# Patient Record
Sex: FEMALE | Race: WHITE | Employment: OTHER | URBAN - METROPOLITAN AREA
[De-identification: names, ages, dates, MRNs, and addresses within clinical notes are randomized per-mention and may not be internally consistent; named-entity substitution may affect disease eponyms.]

---

## 2018-10-30 PROBLEM — C34.90 MALIGNANT NEOPLASM OF LUNG (HCC): Status: ACTIVE | Noted: 2018-10-30

## 2020-01-16 PROBLEM — Z71.89 COUNSELING AND COORDINATION OF CARE: Status: ACTIVE | Noted: 2020-01-16

## 2020-01-16 PROBLEM — F34.1 DYSTHYMIC DISORDER: Status: ACTIVE | Noted: 2020-01-16

## 2021-06-14 ENCOUNTER — HOSPITAL ENCOUNTER (OUTPATIENT)
Dept: VASCULAR LAB | Age: 81
Discharge: HOME | End: 2021-06-14
Payer: MEDICARE

## 2021-06-14 ENCOUNTER — HOSPITAL ENCOUNTER (OUTPATIENT)
Age: 81
Discharge: HOME | End: 2021-06-14
Payer: MEDICARE

## 2021-06-14 DIAGNOSIS — H53.9 VISION ABNORMALITIES: ICD-10-CM

## 2021-06-14 DIAGNOSIS — I63.9 CEREBROVASCULAR ACCIDENT (CVA), UNSPECIFIED MECHANISM (HCC): ICD-10-CM

## 2021-06-14 DIAGNOSIS — R42 DIZZY: ICD-10-CM

## 2021-06-14 LAB
A/G RATIO: 1.3 (ref 1.1–2.2)
ALBUMIN SERPL-MCNC: 3.8 G/DL (ref 3.4–5)
ALP BLD-CCNC: 96 U/L (ref 40–129)
ALT SERPL-CCNC: 16 U/L (ref 10–40)
ANION GAP SERPL CALCULATED.3IONS-SCNC: 9 MMOL/L (ref 3–16)
AST SERPL-CCNC: 21 U/L (ref 15–37)
BASOPHILS ABSOLUTE: 0.1 K/UL (ref 0–0.2)
BASOPHILS RELATIVE PERCENT: 0.9 %
BILIRUB SERPL-MCNC: 0.6 MG/DL (ref 0–1)
BUN BLDV-MCNC: 26 MG/DL (ref 7–20)
CALCIUM SERPL-MCNC: 8.9 MG/DL (ref 8.3–10.6)
CHLORIDE BLD-SCNC: 103 MMOL/L (ref 99–110)
CHOLESTEROL, TOTAL: 149 MG/DL (ref 0–199)
CO2: 28 MMOL/L (ref 21–32)
CREAT SERPL-MCNC: 0.8 MG/DL (ref 0.6–1.2)
EOSINOPHILS ABSOLUTE: 0.4 K/UL (ref 0–0.6)
EOSINOPHILS RELATIVE PERCENT: 5.5 %
FOLATE: >20 NG/ML (ref 4.78–24.2)
GFR AFRICAN AMERICAN: >60
GFR NON-AFRICAN AMERICAN: >60
GLOBULIN: 2.9 G/DL
GLUCOSE BLD-MCNC: 96 MG/DL (ref 70–99)
HCT VFR BLD CALC: 41.7 % (ref 36–48)
HDLC SERPL-MCNC: 64 MG/DL (ref 40–60)
HEMOGLOBIN: 13.9 G/DL (ref 12–16)
LDL CHOLESTEROL CALCULATED: 79 MG/DL
LYMPHOCYTES ABSOLUTE: 1.3 K/UL (ref 1–5.1)
LYMPHOCYTES RELATIVE PERCENT: 15.6 %
MCH RBC QN AUTO: 30.8 PG (ref 26–34)
MCHC RBC AUTO-ENTMCNC: 33.3 G/DL (ref 31–36)
MCV RBC AUTO: 92.6 FL (ref 80–100)
MONOCYTES ABSOLUTE: 0.8 K/UL (ref 0–1.3)
MONOCYTES RELATIVE PERCENT: 10.1 %
NEUTROPHILS ABSOLUTE: 5.5 K/UL (ref 1.7–7.7)
NEUTROPHILS RELATIVE PERCENT: 67.9 %
PDW BLD-RTO: 14.8 % (ref 12.4–15.4)
PLATELET # BLD: 173 K/UL (ref 135–450)
PMV BLD AUTO: 8.1 FL (ref 5–10.5)
POTASSIUM SERPL-SCNC: 4.2 MMOL/L (ref 3.5–5.1)
RBC # BLD: 4.51 M/UL (ref 4–5.2)
SEDIMENTATION RATE, ERYTHROCYTE: 8 MM/HR (ref 0–30)
SODIUM BLD-SCNC: 140 MMOL/L (ref 136–145)
TOTAL PROTEIN: 6.7 G/DL (ref 6.4–8.2)
TRIGL SERPL-MCNC: 31 MG/DL (ref 0–150)
TSH SERPL DL<=0.05 MIU/L-ACNC: 3.16 UIU/ML (ref 0.27–4.2)
VITAMIN B-12: 1503 PG/ML (ref 211–911)
VLDLC SERPL CALC-MCNC: 6 MG/DL
WBC # BLD: 8.1 K/UL (ref 4–11)

## 2021-06-14 PROCEDURE — 36415 COLL VENOUS BLD VENIPUNCTURE: CPT

## 2021-06-14 PROCEDURE — 85025 COMPLETE CBC W/AUTO DIFF WBC: CPT

## 2021-06-14 PROCEDURE — 84443 ASSAY THYROID STIM HORMONE: CPT

## 2021-06-14 PROCEDURE — 80053 COMPREHEN METABOLIC PANEL: CPT

## 2021-06-14 PROCEDURE — 85652 RBC SED RATE AUTOMATED: CPT

## 2021-06-14 PROCEDURE — 82746 ASSAY OF FOLIC ACID SERUM: CPT

## 2021-06-14 PROCEDURE — 80061 LIPID PANEL: CPT

## 2021-06-14 PROCEDURE — 82607 VITAMIN B-12: CPT

## 2021-06-14 PROCEDURE — 93880 EXTRACRANIAL BILAT STUDY: CPT

## 2022-06-07 ENCOUNTER — HOSPITAL ENCOUNTER (OUTPATIENT)
Dept: CT IMAGING | Age: 82
Discharge: HOME | End: 2022-06-07
Payer: MEDICARE

## 2022-06-07 DIAGNOSIS — R06.00 DYSPNEA, UNSPECIFIED TYPE: ICD-10-CM

## 2022-06-07 PROCEDURE — 6360000004 HC RX CONTRAST MEDICATION: Performed by: INTERNAL MEDICINE

## 2022-06-07 PROCEDURE — 71260 CT THORAX DX C+: CPT

## 2022-06-07 RX ADMIN — IOPAMIDOL 75 ML: 755 INJECTION, SOLUTION INTRAVENOUS at 16:32

## 2022-07-14 ENCOUNTER — HOSPITAL ENCOUNTER (OUTPATIENT)
Dept: GENERAL RADIOLOGY | Age: 82
Discharge: HOME | End: 2022-07-14
Payer: MEDICARE

## 2022-07-14 ENCOUNTER — HOSPITAL ENCOUNTER (OUTPATIENT)
Age: 82
Discharge: HOME | End: 2022-07-14
Payer: MEDICARE

## 2022-07-14 DIAGNOSIS — R05.9 COUGH: ICD-10-CM

## 2022-07-14 PROCEDURE — 71046 X-RAY EXAM CHEST 2 VIEWS: CPT

## 2023-04-17 PROBLEM — J44.9 CHRONIC OBSTRUCTIVE PULMONARY DISEASE, UNSPECIFIED COPD TYPE (HCC): Status: ACTIVE | Noted: 2023-04-17

## 2024-02-20 ENCOUNTER — HOSPITAL ENCOUNTER (INPATIENT)
Age: 84
LOS: 2 days | Discharge: HOME | DRG: 194 | End: 2024-02-22
Attending: INTERNAL MEDICINE | Admitting: INTERNAL MEDICINE
Payer: MEDICARE

## 2024-02-20 PROBLEM — J18.8 OTHER PNEUMONIA, UNSPECIFIED ORGANISM: Status: ACTIVE | Noted: 2024-02-20

## 2024-02-20 PROBLEM — J18.9 PNEUMONIA DUE TO ORGANISM: Status: ACTIVE | Noted: 2024-02-20

## 2024-02-20 PROCEDURE — 1200000000 HC SEMI PRIVATE

## 2024-02-20 PROCEDURE — 2580000003 HC RX 258: Performed by: INTERNAL MEDICINE

## 2024-02-20 PROCEDURE — 6360000002 HC RX W HCPCS: Performed by: INTERNAL MEDICINE

## 2024-02-20 RX ORDER — HYDROCODONE BITARTRATE AND ACETAMINOPHEN 5; 325 MG/1; MG/1
1 TABLET ORAL EVERY 6 HOURS PRN
Status: DISCONTINUED | OUTPATIENT
Start: 2024-02-20 | End: 2024-02-22 | Stop reason: HOSPADM

## 2024-02-20 RX ORDER — SERTRALINE HYDROCHLORIDE 25 MG/1
25 TABLET, FILM COATED ORAL DAILY
Status: DISCONTINUED | OUTPATIENT
Start: 2024-02-21 | End: 2024-02-22 | Stop reason: HOSPADM

## 2024-02-20 RX ORDER — IPRATROPIUM BROMIDE AND ALBUTEROL SULFATE 2.5; .5 MG/3ML; MG/3ML
1 SOLUTION RESPIRATORY (INHALATION)
Status: DISCONTINUED | OUTPATIENT
Start: 2024-02-21 | End: 2024-02-22 | Stop reason: HOSPADM

## 2024-02-20 RX ORDER — ENOXAPARIN SODIUM 100 MG/ML
40 INJECTION SUBCUTANEOUS DAILY
Status: DISCONTINUED | OUTPATIENT
Start: 2024-02-21 | End: 2024-02-22 | Stop reason: HOSPADM

## 2024-02-20 RX ORDER — ACETAMINOPHEN 500 MG
500 TABLET ORAL EVERY 6 HOURS PRN
Status: DISCONTINUED | OUTPATIENT
Start: 2024-02-20 | End: 2024-02-22 | Stop reason: HOSPADM

## 2024-02-20 RX ORDER — LEVOFLOXACIN 5 MG/ML
500 INJECTION, SOLUTION INTRAVENOUS EVERY 24 HOURS
Status: DISCONTINUED | OUTPATIENT
Start: 2024-02-20 | End: 2024-02-22 | Stop reason: HOSPADM

## 2024-02-20 RX ORDER — FAMOTIDINE 20 MG/1
20 TABLET, FILM COATED ORAL 2 TIMES DAILY
Status: DISCONTINUED | OUTPATIENT
Start: 2024-02-20 | End: 2024-02-22 | Stop reason: HOSPADM

## 2024-02-20 RX ORDER — SODIUM CHLORIDE 9 MG/ML
INJECTION, SOLUTION INTRAVENOUS CONTINUOUS
Status: DISCONTINUED | OUTPATIENT
Start: 2024-02-20 | End: 2024-02-22 | Stop reason: HOSPADM

## 2024-02-20 RX ADMIN — LEVOFLOXACIN 500 MG: 5 INJECTION, SOLUTION INTRAVENOUS at 22:04

## 2024-02-20 RX ADMIN — SODIUM CHLORIDE: 9 INJECTION, SOLUTION INTRAVENOUS at 22:01

## 2024-02-21 LAB
ALBUMIN SERPL-MCNC: 3.4 G/DL (ref 3.4–5)
ALBUMIN/GLOB SERPL: 1.3 {RATIO} (ref 1.1–2.2)
ALP SERPL-CCNC: 77 U/L (ref 40–129)
ALT SERPL-CCNC: 11 U/L (ref 10–40)
ANION GAP SERPL CALCULATED.3IONS-SCNC: 6 MMOL/L (ref 3–16)
AST SERPL-CCNC: 15 U/L (ref 15–37)
BILIRUB SERPL-MCNC: 0.4 MG/DL (ref 0–1)
BUN SERPL-MCNC: 24 MG/DL (ref 7–20)
CALCIUM SERPL-MCNC: 8.8 MG/DL (ref 8.3–10.6)
CHLORIDE SERPL-SCNC: 101 MMOL/L (ref 99–110)
CO2 SERPL-SCNC: 34 MMOL/L (ref 21–32)
CREAT SERPL-MCNC: 0.7 MG/DL (ref 0.6–1.2)
DEPRECATED RDW RBC AUTO: 15.8 % (ref 12.4–15.4)
GFR SERPLBLD CREATININE-BSD FMLA CKD-EPI: >60 ML/MIN/{1.73_M2}
GLUCOSE SERPL-MCNC: 86 MG/DL (ref 70–99)
HCT VFR BLD AUTO: 39.1 % (ref 36–48)
HGB BLD-MCNC: 12.8 G/DL (ref 12–16)
MCH RBC QN AUTO: 30.1 PG (ref 26–34)
MCHC RBC AUTO-ENTMCNC: 32.6 G/DL (ref 31–36)
MCV RBC AUTO: 92.1 FL (ref 80–100)
PLATELET # BLD AUTO: 176 K/UL (ref 135–450)
PMV BLD AUTO: 8.7 FL (ref 5–10.5)
POTASSIUM SERPL-SCNC: 4.4 MMOL/L (ref 3.5–5.1)
PROT SERPL-MCNC: 6.1 G/DL (ref 6.4–8.2)
RBC # BLD AUTO: 4.24 M/UL (ref 4–5.2)
SODIUM SERPL-SCNC: 141 MMOL/L (ref 136–145)
WBC # BLD AUTO: 8.6 K/UL (ref 4–11)

## 2024-02-21 PROCEDURE — 85027 COMPLETE CBC AUTOMATED: CPT

## 2024-02-21 PROCEDURE — 2700000000 HC OXYGEN THERAPY PER DAY

## 2024-02-21 PROCEDURE — 6360000002 HC RX W HCPCS: Performed by: INTERNAL MEDICINE

## 2024-02-21 PROCEDURE — 36415 COLL VENOUS BLD VENIPUNCTURE: CPT

## 2024-02-21 PROCEDURE — 99222 1ST HOSP IP/OBS MODERATE 55: CPT | Performed by: INTERNAL MEDICINE

## 2024-02-21 PROCEDURE — 94760 N-INVAS EAR/PLS OXIMETRY 1: CPT

## 2024-02-21 PROCEDURE — 1200000000 HC SEMI PRIVATE

## 2024-02-21 PROCEDURE — 94640 AIRWAY INHALATION TREATMENT: CPT

## 2024-02-21 PROCEDURE — 6370000000 HC RX 637 (ALT 250 FOR IP): Performed by: INTERNAL MEDICINE

## 2024-02-21 PROCEDURE — 80053 COMPREHEN METABOLIC PANEL: CPT

## 2024-02-21 RX ORDER — M-VIT,TX,IRON,MINS/CALC/FOLIC 27MG-0.4MG
1 TABLET ORAL DAILY
COMMUNITY

## 2024-02-21 RX ORDER — OMEGA-3S/DHA/EPA/FISH OIL/D3 300MG-1000
400 CAPSULE ORAL DAILY
COMMUNITY

## 2024-02-21 RX ORDER — ONDANSETRON 2 MG/ML
4 INJECTION INTRAMUSCULAR; INTRAVENOUS EVERY 6 HOURS PRN
Status: DISCONTINUED | OUTPATIENT
Start: 2024-02-21 | End: 2024-02-22 | Stop reason: HOSPADM

## 2024-02-21 RX ADMIN — LEVOFLOXACIN 500 MG: 5 INJECTION, SOLUTION INTRAVENOUS at 21:20

## 2024-02-21 RX ADMIN — HYDROCODONE BITARTRATE AND ACETAMINOPHEN 1 TABLET: 5; 325 TABLET ORAL at 23:25

## 2024-02-21 RX ADMIN — ENOXAPARIN SODIUM 40 MG: 100 INJECTION SUBCUTANEOUS at 08:56

## 2024-02-21 RX ADMIN — HYDROCODONE BITARTRATE AND ACETAMINOPHEN 1 TABLET: 5; 325 TABLET ORAL at 00:06

## 2024-02-21 RX ADMIN — IPRATROPIUM BROMIDE AND ALBUTEROL SULFATE 1 DOSE: 2.5; .5 SOLUTION RESPIRATORY (INHALATION) at 19:15

## 2024-02-21 RX ADMIN — HYDROCODONE BITARTRATE AND ACETAMINOPHEN 1 TABLET: 5; 325 TABLET ORAL at 08:56

## 2024-02-21 RX ADMIN — IPRATROPIUM BROMIDE AND ALBUTEROL SULFATE 1 DOSE: 2.5; .5 SOLUTION RESPIRATORY (INHALATION) at 13:40

## 2024-02-21 RX ADMIN — IPRATROPIUM BROMIDE AND ALBUTEROL SULFATE 1 DOSE: 2.5; .5 SOLUTION RESPIRATORY (INHALATION) at 16:16

## 2024-02-21 RX ADMIN — HYDROCODONE BITARTRATE AND ACETAMINOPHEN 1 TABLET: 5; 325 TABLET ORAL at 18:11

## 2024-02-21 RX ADMIN — FAMOTIDINE 20 MG: 20 TABLET, FILM COATED ORAL at 21:20

## 2024-02-21 RX ADMIN — IPRATROPIUM BROMIDE AND ALBUTEROL SULFATE 1 DOSE: 2.5; .5 SOLUTION RESPIRATORY (INHALATION) at 09:06

## 2024-02-21 ASSESSMENT — PAIN - FUNCTIONAL ASSESSMENT
PAIN_FUNCTIONAL_ASSESSMENT: ACTIVITIES ARE NOT PREVENTED
PAIN_FUNCTIONAL_ASSESSMENT: ACTIVITIES ARE NOT PREVENTED

## 2024-02-21 ASSESSMENT — PAIN SCALES - GENERAL
PAINLEVEL_OUTOF10: 4
PAINLEVEL_OUTOF10: 6
PAINLEVEL_OUTOF10: 6
PAINLEVEL_OUTOF10: 8

## 2024-02-21 ASSESSMENT — PAIN DESCRIPTION - DESCRIPTORS
DESCRIPTORS: ACHING
DESCRIPTORS: ACHING;DISCOMFORT
DESCRIPTORS: ACHING;DISCOMFORT
DESCRIPTORS: ACHING;DISCOMFORT;CRAMPING

## 2024-02-21 ASSESSMENT — PAIN DESCRIPTION - LOCATION
LOCATION: BACK;CHEST
LOCATION: RIB CAGE
LOCATION: BACK
LOCATION: RIB CAGE

## 2024-02-21 ASSESSMENT — PAIN DESCRIPTION - ONSET: ONSET: GRADUAL

## 2024-02-21 ASSESSMENT — PAIN DESCRIPTION - ORIENTATION
ORIENTATION: LEFT
ORIENTATION: LEFT
ORIENTATION: LEFT;RIGHT

## 2024-02-21 ASSESSMENT — PAIN DESCRIPTION - PAIN TYPE: TYPE: CHRONIC PAIN

## 2024-02-21 ASSESSMENT — PAIN DESCRIPTION - FREQUENCY: FREQUENCY: INTERMITTENT

## 2024-02-21 NOTE — PLAN OF CARE
Problem: Discharge Planning  Goal: Discharge to home or other facility with appropriate resources  Outcome: Progressing  Flowsheets (Taken 2/21/2024 0900)  Discharge to home or other facility with appropriate resources: Identify barriers to discharge with patient and caregiver     Problem: Safety - Adult  Goal: Free from fall injury  Outcome: Progressing     Problem: ABCDS Injury Assessment  Goal: Absence of physical injury  Outcome: Progressing     Problem: Pain  Goal: Verbalizes/displays adequate comfort level or baseline comfort level  Outcome: Progressing

## 2024-02-21 NOTE — PROGRESS NOTES
Pt had episode of clear emesis. Called Dr. Cardozo office to ask her to order Zofran. MD ordered.

## 2024-02-21 NOTE — H&P
Hospital Medicine History & Physical    Patient:  Nohemi Pederson  YOB: 1940  Date of Service: 2/21/24  MRN: [id number]    PCP: Vangie Cardozo MD    Date of Admission: 2/20/2024      Chief Complaint:  No chief complaint on file.        History Of Present Illness:    The patient is a 83 y.o. female who presents to [facility] with c/o as above  Had a fall few days ago  Has rib fx  Now is diagnosed with pneumonia  On iv abx  Feels better  Denies any other complaints    Past Medical History:        Diagnosis Date    Hypertension        Past Surgical History:        Procedure Laterality Date    JOINT REPLACEMENT         Family History:  Family History   Problem Relation Age of Onset    High Blood Pressure Mother     High Blood Pressure Father        Medications Prior to Admission:    Prior to Admission medications    Medication Sig Start Date End Date Taking? Authorizing Provider   Multiple Vitamins-Minerals (THERAPEUTIC MULTIVITAMIN-MINERALS) tablet Take 1 tablet by mouth daily   Yes ProviderKemar MD   cholecalciferol (VITAMIN D3) 400 UNIT TABS tablet Take 1 tablet by mouth daily   Yes ProviderKemar MD   sertraline (ZOLOFT) 25 MG tablet Take 1 tablet by mouth daily  Patient taking differently: Take 1 tablet by mouth daily as needed (anxiety) 9/27/23   Vangie Cardozo MD   cetirizine (ZYRTEC ALLERGY) 10 MG tablet Take 1 tablet by mouth daily  Patient not taking: Reported on 2/21/2024 4/17/23   Vangie Cardozo MD   escitalopram (LEXAPRO) 10 MG tablet Take 1 tablet by mouth daily  Patient not taking: Reported on 2/21/2024 6/7/22   Vangie Cardozo MD   atorvastatin (LIPITOR) 10 MG tablet TAKE ONE TABLET BY MOUTH DAILY  Patient not taking: Reported on 2/21/2024 2/14/22   aVngie Cardozo MD   furosemide (LASIX) 20 MG tablet Take 1

## 2024-02-21 NOTE — PROGRESS NOTES
4 Eyes Skin Assessment     NAME:  Nohemi Pederson  YOB: 1940  MEDICAL RECORD NUMBER:  [id number]    The patient is being assessed for  Admission    I agree that at least one RN has performed a thorough Head to Toe Skin Assessment on the patient. ALL assessment sites listed below have been assessed.      Areas assessed by both nurses:    Head, Face, Ears, Shoulders, Back, Chest, Arms, Elbows, Hands, Sacrum. Buttock, Coccyx, Ischium, and Legs. Feet and Heels        Does the Patient have a Wound? No noted wound(s)       [place] Prevention initiated by RN: No  Wound Care Orders initiated by RN: No    Pressure Injury (Stage 3,4, Unstageable, DTI, NWPT, and Complex wounds) if present, place Wound referral order by RN under : No    New Ostomies, if present place, Ostomy referral order under : No     Nurse 1 eSignature: Electronically signed by Krystle Mai RN on 2/21/24 at 6:06 AM EST    **SHARE this note so that the co-signing nurse can place an eSignature**    Nurse 2 eSignature: Electronically signed by Sade Ruelas RN on 2/21/24 at 6:07 AM EST

## 2024-02-21 NOTE — CARE COORDINATION
Case Management Assessment  Initial Evaluation    Date/Time of Evaluation: 2/21/2024 10:42 AM  Assessment Completed by: EFRA Sagastume    If patient is discharged prior to next notation, then this note serves as note for discharge by case management.    Patient Name: Nohemi Pederson                   YOB: 1940  Diagnosis: Pneumonia due to organism [J18.9]  Other pneumonia, unspecified organism [J18.8]                   Date / Time: 2/20/2024  8:45 PM    Patient Admission Status: Inpatient   Readmission Risk (Low < 19, Mod (19-27), High > 27): Readmission Risk Score: 10.5    Current PCP: Vangie Cardozo MD  PCP verified by CM? (P) Yes    Chart Reviewed: Yes      History Provided by: (P) Patient  Patient Orientation: (P) Alert and Oriented, Person, Place, Situation, Self    Patient Cognition: (P) Alert    Hospitalization in the last 30 days (Readmission):  No    If yes, Readmission Assessment in  Navigator will be completed.    Advance Directives:      Code Status: Full Code   Patient's Primary Decision Maker is: (P) Legal Next of Kin      Discharge Planning:    Patient lives with: (P) Spouse/Significant Other Type of Home: (P) House (Ranch home with 4+4 ELIECER)  Primary Care Giver: (P) Self  Patient Support Systems include: (P) Spouse/Significant Other   Current Financial resources: (P) Medicare  Current community resources: (P) None  Current services prior to admission: (P) Durable Medical Equipment, Oxygen Therapy            Current DME: (P) Oxygen Therapy (Comment) (2 L through [facility].  Has portability.)            Type of Home Care services:  (P) None    ADLS  Prior functional level: (P) Independent in ADLs/IADLs  Current functional level: (P) Independent in ADLs/IADLs    PT AM-PAC:   /24  OT AM-PAC:   /24    Family can provide assistance at DC: (P) Yes  Would you like Case Management to discuss the discharge plan with any other family members/significant others, and if so, who? (P) Yes

## 2024-02-21 NOTE — PROGRESS NOTES
Pt arrived to room 4116 from [facility] via [facility] transport. Pt ambulated around room independently, tolerated well but d/t admission for fall standard safety precautions in place. VSS on 2L NC, dependent. A&Ox4, oriented to room and call light. No orders placed d/t pt being a direct admit. Call placed to Dr. Cardozo, see new orders. Bedside table and needed items within reach. Admission questions and assessment completed, pt states no additional needs at this time will continue to monitor.

## 2024-02-22 VITALS
TEMPERATURE: 97.8 F | WEIGHT: 91.27 LBS | OXYGEN SATURATION: 99 % | BODY MASS INDEX: 17.92 KG/M2 | HEART RATE: 80 BPM | RESPIRATION RATE: 16 BRPM | SYSTOLIC BLOOD PRESSURE: 106 MMHG | DIASTOLIC BLOOD PRESSURE: 57 MMHG | HEIGHT: 60 IN

## 2024-02-22 PROCEDURE — 99238 HOSP IP/OBS DSCHRG MGMT 30/<: CPT | Performed by: INTERNAL MEDICINE

## 2024-02-22 PROCEDURE — 6370000000 HC RX 637 (ALT 250 FOR IP): Performed by: INTERNAL MEDICINE

## 2024-02-22 PROCEDURE — 94640 AIRWAY INHALATION TREATMENT: CPT

## 2024-02-22 PROCEDURE — 2700000000 HC OXYGEN THERAPY PER DAY

## 2024-02-22 PROCEDURE — 94761 N-INVAS EAR/PLS OXIMETRY MLT: CPT

## 2024-02-22 PROCEDURE — 6360000002 HC RX W HCPCS: Performed by: INTERNAL MEDICINE

## 2024-02-22 RX ORDER — LEVOFLOXACIN 500 MG/1
500 TABLET, FILM COATED ORAL DAILY
Qty: 7 TABLET | Refills: 0 | Status: SHIPPED | OUTPATIENT
Start: 2024-02-22 | End: 2024-02-29

## 2024-02-22 RX ORDER — CLONIDINE HYDROCHLORIDE 0.1 MG/1
0.2 TABLET ORAL ONCE
Status: COMPLETED | OUTPATIENT
Start: 2024-02-22 | End: 2024-02-22

## 2024-02-22 RX ORDER — ALBUTEROL SULFATE 90 UG/1
2 AEROSOL, METERED RESPIRATORY (INHALATION) 4 TIMES DAILY PRN
Qty: 18 G | Refills: 0 | Status: SHIPPED | OUTPATIENT
Start: 2024-02-22

## 2024-02-22 RX ORDER — LORAZEPAM 2 MG/ML
0.5 INJECTION INTRAMUSCULAR ONCE
Status: COMPLETED | OUTPATIENT
Start: 2024-02-22 | End: 2024-02-22

## 2024-02-22 RX ADMIN — IPRATROPIUM BROMIDE AND ALBUTEROL SULFATE 1 DOSE: 2.5; .5 SOLUTION RESPIRATORY (INHALATION) at 08:02

## 2024-02-22 RX ADMIN — SERTRALINE 25 MG: 25 TABLET, FILM COATED ORAL at 08:21

## 2024-02-22 RX ADMIN — IPRATROPIUM BROMIDE AND ALBUTEROL SULFATE 1 DOSE: 2.5; .5 SOLUTION RESPIRATORY (INHALATION) at 11:58

## 2024-02-22 RX ADMIN — CLONIDINE HYDROCHLORIDE 0.2 MG: 0.1 TABLET ORAL at 02:55

## 2024-02-22 RX ADMIN — ENOXAPARIN SODIUM 40 MG: 100 INJECTION SUBCUTANEOUS at 08:21

## 2024-02-22 RX ADMIN — ONDANSETRON 4 MG: 2 INJECTION INTRAMUSCULAR; INTRAVENOUS at 02:37

## 2024-02-22 RX ADMIN — Medication 0.5 MG: at 02:31

## 2024-02-22 RX ADMIN — FAMOTIDINE 20 MG: 20 TABLET, FILM COATED ORAL at 08:21

## 2024-02-22 RX ADMIN — HYDROCODONE BITARTRATE AND ACETAMINOPHEN 1 TABLET: 5; 325 TABLET ORAL at 13:18

## 2024-02-22 RX ADMIN — IPRATROPIUM BROMIDE AND ALBUTEROL SULFATE 1 DOSE: 2.5; .5 SOLUTION RESPIRATORY (INHALATION) at 15:46

## 2024-02-22 ASSESSMENT — PAIN DESCRIPTION - DESCRIPTORS: DESCRIPTORS: ACHING

## 2024-02-22 ASSESSMENT — PAIN SCALES - GENERAL
PAINLEVEL_OUTOF10: 0
PAINLEVEL_OUTOF10: 8

## 2024-02-22 ASSESSMENT — PAIN DESCRIPTION - LOCATION: LOCATION: RIB CAGE

## 2024-02-22 ASSESSMENT — PAIN - FUNCTIONAL ASSESSMENT: PAIN_FUNCTIONAL_ASSESSMENT: PREVENTS OR INTERFERES SOME ACTIVE ACTIVITIES AND ADLS

## 2024-02-22 ASSESSMENT — PAIN DESCRIPTION - PAIN TYPE: TYPE: ACUTE PAIN

## 2024-02-22 ASSESSMENT — PAIN DESCRIPTION - ORIENTATION: ORIENTATION: LEFT

## 2024-02-22 ASSESSMENT — PAIN DESCRIPTION - FREQUENCY: FREQUENCY: INTERMITTENT

## 2024-02-22 ASSESSMENT — PAIN DESCRIPTION - ONSET: ONSET: ON-GOING

## 2024-02-22 NOTE — PROGRESS NOTES
Pt A&Ox4, VSS, denies any pain. Pt is on 2L/NC, which is pts baseline. Pt denies any SOB at this time. Pt has NS running at 5ml/hr. Pt took morning medications without any issues. Pt denies further needs at this time. All safety measures in place.     Electronically signed by Tarah Yun RN on 2/22/2024 at 10:39 AM

## 2024-02-22 NOTE — FLOWSHEET NOTE
Patient stated she feels better now that she's outside the room.     Call made to Dr. Cardozo's on- call physician. Left a message regarding pts. Condition, awaiting for response.

## 2024-02-22 NOTE — FLOWSHEET NOTE
Received Ativan 2mg/ml from Pharmacy.  Gave 0.5mg= 0.25ml IV as ordered. Wasted remaining 0.75ml, as advised by Pharmacy staff. Witnessed and Co-signed by Josse Vee RN.   Electronically signed by Josse Lawson RN on 2/22/2024 at 3:06 AM

## 2024-02-22 NOTE — DISCHARGE INSTR - DIET

## 2024-02-22 NOTE — FLOWSHEET NOTE
Pt. Is noted to be restless, stated she is feeling claustrophobic. Opted to sit outside the room. V/S bp= 173/90., WY= 91    Secure message sent to Meg Song NP, with response to inform attending Physician.

## 2024-02-22 NOTE — PROGRESS NOTES
Written and verbal DC instructions reviewed with pt. Pt states understanding, all questions answered. IV removed without complications. Dressing clean dry and intact. Pt awaiting ride to arrive.    Electronically signed by Tarah Yun RN on 2/22/2024 at 3:58 PM

## 2024-02-22 NOTE — PLAN OF CARE
Problem: Discharge Planning  Goal: Discharge to home or other facility with appropriate resources  2/22/2024 1013 by Tarah Yun RN  Outcome: Progressing  2/21/2024 2148 by Radha King RN  Outcome: Progressing  Flowsheets (Taken 2/21/2024 2140)  Discharge to home or other facility with appropriate resources: Identify barriers to discharge with patient and caregiver     Problem: Safety - Adult  Goal: Free from fall injury  2/22/2024 1013 by Tarah Yun RN  Outcome: Progressing  2/21/2024 2148 by Radha King RN  Outcome: Progressing     Problem: ABCDS Injury Assessment  Goal: Absence of physical injury  2/22/2024 1013 by Tarah Yun RN  Outcome: Progressing  2/21/2024 2148 by Radha King RN  Outcome: Progressing     Problem: Pain  Goal: Verbalizes/displays adequate comfort level or baseline comfort level  2/22/2024 1013 by Tarah Yun RN  Outcome: Progressing  2/21/2024 2148 by Radha King RN  Outcome: Progressing     Problem: Respiratory - Adult  Goal: Achieves optimal ventilation and oxygenation  2/22/2024 1013 by Tarah Yun RN  Outcome: Progressing  2/21/2024 2148 by Radha King RN  Outcome: Progressing  Flowsheets (Taken 2/21/2024 2140)  Achieves optimal ventilation and oxygenation: Assess for changes in respiratory status

## 2024-02-22 NOTE — PLAN OF CARE
Problem: Discharge Planning  Goal: Discharge to home or other facility with appropriate resources  2/21/2024 2148 by Radha King RN  Outcome: Progressing  Flowsheets (Taken 2/21/2024 2140)  Discharge to home or other facility with appropriate resources: Identify barriers to discharge with patient and caregiver  2/21/2024 1428 by Milvia Jiang RN  Outcome: Progressing  Flowsheets (Taken 2/21/2024 0900)  Discharge to home or other facility with appropriate resources: Identify barriers to discharge with patient and caregiver     Problem: Safety - Adult  Goal: Free from fall injury  2/21/2024 2148 by Radha King RN  Outcome: Progressing  2/21/2024 1428 by Milvia Jiang RN  Outcome: Progressing     Problem: ABCDS Injury Assessment  Goal: Absence of physical injury  2/21/2024 2148 by Radha King RN  Outcome: Progressing  2/21/2024 1428 by Milvia Jiang RN  Outcome: Progressing     Problem: Pain  Goal: Verbalizes/displays adequate comfort level or baseline comfort level  2/21/2024 2148 by Radha King RN  Outcome: Progressing  2/21/2024 1428 by Milvia Jiang RN  Outcome: Progressing     Problem: Respiratory - Adult  Goal: Achieves optimal ventilation and oxygenation  Outcome: Progressing  Flowsheets (Taken 2/21/2024 2140)  Achieves optimal ventilation and oxygenation: Assess for changes in respiratory status

## 2024-02-22 NOTE — FLOWSHEET NOTE
Telephone order received from Dr. Cardozo. Obtained orders for Clonidine 0.2mg PO, 1 time dose and Ativan 0.5mg IV, 1 time dose. Writer read back orders, Dr. Cardozo verified accuracy of orders.

## 2024-02-23 ENCOUNTER — CARE COORDINATION (OUTPATIENT)
Dept: CASE MANAGEMENT | Age: 84
End: 2024-02-23

## 2024-02-23 NOTE — CARE COORDINATION
Care Transitions Outreach Attempt    Call within 2 business days of discharge: Yes     First initial 24 hr follow up outreach call attempt, no answer.  CTN could not leave vm as phone rang and rang.   CTN will continue with outreach call attempts.      Patient: Nohemi Pederson Patient : 1940 MRN: [id number]    Last Discharge Facility       Date Complaint Diagnosis Description Type Department Provider    24   Admission (Discharged) [facility]W Vangie Cardozo MD              Was this an external facility discharge? No Discharge Facility Name: [facility]    Noted following upcoming appointments from discharge chart review:   [facility] follow up appointment(s): No future appointments.  Non-[facility]  follow up appointment(s): n/a    TERRI Puga, RN   Care Transition Nurse  Mobile: [phone]

## 2024-02-26 ENCOUNTER — CARE COORDINATION (OUTPATIENT)
Dept: CASE MANAGEMENT | Age: 84
End: 2024-02-26

## 2024-02-26 NOTE — CARE COORDINATION
Care Transitions Initial Follow Up Call    Call within 2 business days of discharge: Yes    Patient Current Location:  Home: [address]  [facility] [address]    Care Transition Nurse contacted the patient by telephone to perform post hospital discharge assessment. Verified name and  with patient as identifiers. Provided introduction to self, and explanation of the Care Transition Nurse role.     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 Continuous O2  Discharge Date: 24 RARS: Readmission Risk Score: 9.6      Last Discharge Facility       Date Complaint Diagnosis Description Type Department Provider    24   Admission (Discharged) [facility] Vangie Cardozo MD            Was this an external facility discharge? No Discharge Facility:     Challenges to be reviewed by the provider   Additional needs identified to be addressed with provider: Yes  Post hospitalization visit still needs scheduled. Patient continues to be uncomfortable with rib pain and unsure when she would be able to come into office for visit. Please contact patient at [phone] to assist with scheduling. Declined assistance from CTN. Thank you         Method of communication with provider: chart routing.    Patient answered call and verified . Patient pleasant and agreeable to transition call. Continues to be uncomfortable from rib pain. Confirmed that she has AVS and taking all medication as directed. Getting some relief with prescription pain medication, but minimal. Continues to wear O2 continuously. CTN offered to assist with TCM scheduling, but declined. Stated she continues to have significant rib pain and unsure when she would be able to go to any appt. CTN routed message to provider to assist with scheduling. Denied any acute needs at present time.  Agreeable to f/u calls.

## 2024-02-29 ENCOUNTER — CARE COORDINATION (OUTPATIENT)
Dept: CASE MANAGEMENT | Age: 84
End: 2024-02-29

## 2024-02-29 NOTE — CARE COORDINATION
Care Transitions Outreach Attempt    Follow up outreach call attempt, no answer.  CTN left VM with contact information and request for return call.  CTN will continue with outreach call attempts.    Patient: Nohemi Pederson Patient : 1940 MRN: [id number]    Last Discharge Facility       Date Complaint Diagnosis Description Type Department Provider    24   Admission (Discharged) Vangie Clay MD              Was this an external facility discharge? No Discharge Facility Name: [facility]    Noted following upcoming appointments from discharge chart review:   [facility] follow up appointment(s): No future appointments.  Non-[facility]  follow up appointment(s): n/a    TERRI Puga, RN   Care Transition Nurse  Mobile: [phone]

## 2024-03-05 ENCOUNTER — CARE COORDINATION (OUTPATIENT)
Dept: CASE MANAGEMENT | Age: 84
End: 2024-03-05

## 2024-03-05 NOTE — CARE COORDINATION
Care Transitions Follow Up Call    Patient Current Location:  Home: [address]  [place] [facility] [facility] [address]    [facility] Care Coordinator contacted the patient by telephone to follow up after admission on .  Verified name and  with patient as identifiers.    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 Continuous O2  Discharge Date: 24 RARS: Readmission Risk Score: 9.6      Needs to be reviewed by the provider   Additional needs identified to be addressed with provider: YES  Post hospitalization visit still needs scheduled. Patient continues to be uncomfortable with rib pain and unsure when she would be able to come into office for visit. Please contact patient at [phone] to assist with scheduling. Declined assistance from CTN. Thank you              Method of communication with provider: none.    Spoke with Nohemi Pederson who reported that she is doing ok. Patient stated that she do still have rib pain average pain 6-8/10. Patient stated that with deep breathing pain goes to an 8/10. Patient stated that she continue on O2 at 2 liter via n/c. She stated that she do not wear it all the time because she breaths fine when she is just sitting. Patient stated that she also takes O2 off when she needs to move around the house because she has a dog and a  with dementia who from time to time will get all twisted up in the tubing so to prevent falls she will put O2 on and recovery when she get back to her chair. Patient had just did this exact thing when she answered the phone with writer. Patient applied O2 while talking with writer. Patient checked O2 sat and it was 94%. Patient c/o dry mouth which she stated that her daughter purchase some biotene which she stated that she uses at night. Patient reported that she is also using a mouthwash from her dentist for some

## 2024-03-06 ENCOUNTER — HOSPITAL ENCOUNTER (OUTPATIENT)
Age: 84
Discharge: HOME | End: 2024-03-06
Payer: MEDICARE

## 2024-03-06 ENCOUNTER — CARE COORDINATION (OUTPATIENT)
Dept: CASE MANAGEMENT | Age: 84
End: 2024-03-06

## 2024-03-06 ENCOUNTER — HOSPITAL ENCOUNTER (OUTPATIENT)
Dept: GENERAL RADIOLOGY | Age: 84
Discharge: HOME | End: 2024-03-06
Attending: INTERNAL MEDICINE
Payer: MEDICARE

## 2024-03-06 DIAGNOSIS — J44.9 CHRONIC OBSTRUCTIVE PULMONARY DISEASE, UNSPECIFIED COPD TYPE (HCC): ICD-10-CM

## 2024-03-06 PROCEDURE — 71046 X-RAY EXAM CHEST 2 VIEWS: CPT

## 2024-03-06 NOTE — CARE COORDINATION
Care Transitions Follow Up Call    Patient Current Location:  Home: [address]  [place] [address]    Care Transition Nurse contacted the patient by telephone to follow up after admission on .  Verified name and  with patient as identifiers.    Patient: Nohemi Pederson  Patient : 1940   MRN: [id number]  Reason for Admission: pna  Discharge Date: 24 RARS: Readmission Risk Score: 9.6      Needs to be reviewed by the provider   Additional needs identified to be addressed with provider: No  none             Method of communication with provider: none.    Conversation:  Spoke to Nohemi Pederson. She was getting ready to go to her PCP appt and could not talk. Was a little SOB but states she has her O2 on and will sit and catch her breath. States cannot talk to me right now.         External follow up appointment(s):       Care Transitions Subsequent and Final Call    Subsequent and Final Calls  Care Transitions Interventions  Other Interventions:             Care Transition Nurse provided contact information for future needs. Plan for follow-up call in 1-2 days based on severity of symptoms and risk factors.  Plan for next call: symptom management-  Assess overall status, abnormal signs and symptoms, availability and effectiveness of medications, activity level and tolerance, falls/other unexpected events, findings from follow up appointments, seeking medical attention, who/when to call prn any needs, etc.     Becki Gutierrez, BASHIRN, RN   [facility] Transition Nurse  [phone]

## 2024-03-11 ENCOUNTER — CARE COORDINATION (OUTPATIENT)
Dept: CASE MANAGEMENT | Age: 84
End: 2024-03-11

## 2024-03-11 NOTE — CARE COORDINATION
Care Transitions Follow Up Call    Patient: Nohemi Pederson  Patient : 1940   MRN: [id number]  Reason for Admission: Rib fx, PNA, COPD  Discharge Date: 24 RARS: Readmission Risk Score: 9.6    Second and final outreach call attempt, no answer.  CTN left VM with contact information and request for return call.  CTN will resolve episode and remain available.    Pt had a HFU with PCP 3/6.     Follow Up  No future appointments.  External follow up appointment(s): n/a    TERRI Puga, RN   Care Transition Nurse  Mobile: [phone]

## 2025-06-16 ENCOUNTER — HOSPITAL ENCOUNTER (OUTPATIENT)
Dept: GENERAL RADIOLOGY | Age: 85
Discharge: HOME | End: 2025-06-16
Attending: INTERNAL MEDICINE
Payer: MEDICARE

## 2025-06-16 ENCOUNTER — HOSPITAL ENCOUNTER (OUTPATIENT)
Age: 85
Discharge: HOME | End: 2025-06-16
Payer: MEDICARE

## 2025-06-16 DIAGNOSIS — R07.89 CHEST WALL PAIN: ICD-10-CM

## 2025-06-16 PROBLEM — J96.11 CHRONIC RESPIRATORY FAILURE WITH HYPOXIA (HCC): Status: ACTIVE | Noted: 2025-06-16

## 2025-06-16 PROCEDURE — 71120 X-RAY EXAM BREASTBONE 2/>VWS: CPT

## 2025-06-16 PROCEDURE — 71110 X-RAY EXAM RIBS BIL 3 VIEWS: CPT

## 2025-06-16 PROCEDURE — 71046 X-RAY EXAM CHEST 2 VIEWS: CPT
